# Patient Record
Sex: FEMALE | Race: WHITE | HISPANIC OR LATINO
[De-identification: names, ages, dates, MRNs, and addresses within clinical notes are randomized per-mention and may not be internally consistent; named-entity substitution may affect disease eponyms.]

---

## 2022-06-16 ENCOUNTER — NON-APPOINTMENT (OUTPATIENT)
Age: 27
End: 2022-06-16

## 2023-01-28 ENCOUNTER — NON-APPOINTMENT (OUTPATIENT)
Age: 28
End: 2023-01-28

## 2024-01-13 ENCOUNTER — NON-APPOINTMENT (OUTPATIENT)
Age: 29
End: 2024-01-13

## 2024-01-17 ENCOUNTER — APPOINTMENT (OUTPATIENT)
Age: 29
End: 2024-01-17
Payer: COMMERCIAL

## 2024-01-17 VITALS
BODY MASS INDEX: 27.28 KG/M2 | HEART RATE: 82 BPM | DIASTOLIC BLOOD PRESSURE: 84 MMHG | SYSTOLIC BLOOD PRESSURE: 141 MMHG | HEIGHT: 68 IN | WEIGHT: 180 LBS

## 2024-01-17 DIAGNOSIS — S39.012A STRAIN OF MUSCLE, FASCIA AND TENDON OF LOWER BACK, INITIAL ENCOUNTER: ICD-10-CM

## 2024-01-17 PROCEDURE — 99203 OFFICE O/P NEW LOW 30 MIN: CPT

## 2024-01-17 PROCEDURE — 72100 X-RAY EXAM L-S SPINE 2/3 VWS: CPT

## 2024-01-17 NOTE — PHYSICAL EXAM
Alessandra comes in for complete physical with pap and pelvic.  Has concerns regarding refill of her levothyroxine which she has been out of for about a week. Patient is getting medications from endocrinologist however they could not refill it as patient was last seen by them in 2015. They have advised regarding continuing follow-up with PCP.    Patient Active Problem List   Diagnosis   • JOINT PAIN-SHLDER -   right   • Right elbow sprain   • Thyroid disease   • GERD (gastroesophageal reflux disease)   • Unspecified hypothyroidism   • Goiter, unspecified   • Abnormal thyroid ultrasound       Past Surgical History:   Procedure Laterality Date   •  SECTION, CLASSIC      3   • ESOPHAGOGASTRODUODENOSCOPY TRANSORAL FLEX W/BX SINGLE OR MULT  2012    GERD, gastroduodenitis   • TUBAL LIGATION         Current Outpatient Prescriptions   Medication Sig Dispense Refill   • levothyroxine (SYNTHROID, LEVOTHROID) 137 MCG tablet Take 137 mcg by mouth daily. Needs lab before future refills 45 tablet 0     No current facility-administered medications for this visit.        ALLERGIES:  No Known Allergies    Social History     Social History   • Marital status: Single     Spouse name: N/A   • Number of children: 4   • Years of education: N/A     Occupational History   • [employer]     Social History Main Topics   • Smoking status: Passive Smoke Exposure - Never Smoker   • Smokeless tobacco: Never Used      Comment: sister smokes at home   • Alcohol use No   • Drug use: No   • Sexual activity: No     Other Topics Concern   • Not on file     Social History Narrative       Family History   Problem Relation Age of Onset   • Cancer Mother      kidney   • Cancer Brother      esophagus- smoker   • Thyroid Sister        REVIEW OF SYSTEMS  CONSTITUTIONAL:  Pt denies fever or chills, night sweats, appetite problems, fatigue, any other difficulties  EYES: Pt denies recent changes in vision  Pt does wear  glasses.  EARS: Pt. deniesany ear or hearing problems, ear pain, ringing in the ears, discharge  NOSE & THROAT: Pt denies any nose, sinus or throat problems, hay fever, chronc sinususitis, nose bleeds, throat irritation, hoarseness  LUNGS: Pt denies any lung problems, cough, shortness of breath, wheezing, asthma, COPD or emphysema  HEART: Pt denies Any cardiac problems, chest pain or tightness, heart murmurs, hypertension, palpitations, irregular heartbeat, rapid heartbeat, orthopnea, paroxysmal nocturnal dyspnea, claudication, leg or ankle swelling, coronary artery disease  GI: Pt denies heartburn or indigestion, nausea, vomiting, abdominal pain, diarrhea, constipation, blood in stool, black tarry stools  : Pt denies urinary tract problems, dysuria, urgency, blood in urine, nocturia, sexual concerns  MUSCULOSKELETAL: Pt denies  any musculoskeletal complaints, painful or swollen joints, back pain  NEUROLOGICAL: Pt denies any neurological problems, headaches, dizziness  ENDOCRINE: Pt has no history of diabetes, but has thyroid disease  HEMATOLOGY: Pt denies any hematologic problems, anemia, lymphadenopathy, bleeding problems, easy bruising  PSYCH: Pt denies  no symptoms of depression    HEALTH CARE MAINTENANCE:    Cholesterol - Will check today    Mammogram - she was counseled regarding importance of mammogram and risks of not having up to missing diagnoses of cancer.    Diet/Exercise - Discussed    Tobacco/Alcohol use - Discussed    Calcium intake - Discussed, appears to have adequate intake    Osteoporosis prevention - Discussed    Colon cancer screening - Discussed    Immunizations: Discussed    EXAM:  General - Healthy, alert, in no distress  Visit Vitals   • /72   • Pulse 68   • Temp 98 °F (36.7 °C) (Oral)   • Resp 14   • Ht 5' 2\" (1.575 m)   • Wt 72.8 kg   • LMP  (LMP Unknown)   • BMI 29.34 kg/m2      Ears - TM's and canals normal  PERRLA, EOMI, conjunctivae normal.   Mouth -No lesions   Neck - supple,  no lymphadenopathy and no thyromegaly  Lungs - clear to auscultation, easy respirations  Heart - S1 and S2, Regular rate and rhythm and No murmur, rub, gallop  Breasts/Axillae - Breast are symmetrical, no skin changes, No masses, no dimpling, no nipple discharge and No axillary or supraclavicular nodes bilaterally  Abdomen - Soft, flat, non-tender and No masses, organomegaly  Extremities - Normal, No cyanosis, clubbing and No edema  Peripheral pulses 2 plus and equal posterior tibial, dorsalis pedis and radial.   Deep tendon reflexes legs 2 plus and equal bilaterally.   Skin - Skin color, texture, turgor are normal. There are no bruises, rashes or lesions on areas seen. She has refused skin cancer screening.  Pelvic--External Genitalia -  Normal appearing labia, clitoris, perineum, no lesions and Carroll's, bulbourethral glands normal  Vagina -  Normal mucosa and No lesions  Cervix - Normal in appearance, no lesions or masses and pap smear obtained  Uterus - small, nontender, symmetrical  Adnexa - unremarkable, non-tender, no fullness noted and no masses noted  Rectal - normal tone, no masses and no hemorrhoids     (Z00.00) Annual physical exam  (primary encounter diagnosis)  Plan: CBC & AUTO DIFFERENTIAL, COMPREHENSIVE         METABOLIC PANEL, LIPID PANEL WITH REFLEX,         THINPREP PAP TEST WITH HPV REGARDLESS,       (Z12.31) Visit for screening mammogram  Plan: MAMMO SCREENING BILATERAL    (E03.9) Hypothyroidism, unspecified type  Comment: Patient counseled regarding importance of current for and regular monitoring of thyroid function  Plan: THYROID STIMULATING HORMONE    (Z11.59) Need for hepatitis C screening test  Comment: Patient will check with her insurance regarding coverage and call the clinic who would like to proceed with the test    (Z12.11) Screen for colon cancer  Comment: We have discussed risks of not performing the screening. Patient is interested in colonoscopy.  Plan: OPEN ACCESS  COLONOSCOPY    Patient was counseled regarding importance of at least being seen once a year.     [Antalgic] : not antalgic [de-identified] : Examination of the lumbar spine reveals no midline tenderness palpation, step-offs, or skin lesions. Decreased range of motion with respect to flexion, extension, lateral bending, and rotation. No tenderness to palpation of the sciatic notch. No tenderness palpation of the bilateral greater trochanters. No pain with passive internal/external rotation of the hips. No instability of bilateral lower extremities.  Negative COURTNEY. Negative straight leg raise bilaterally. No bowstring. Negative femoral stretch. 5 out of 5 iliopsoas, hip abductors, hips adductors, quadriceps, hamstrings, gastrocsoleus, tibialis anterior, extensor hallucis longus, peroneals. Grossly intact sensation to light touch bilateral lower extremities. 1+ patellar and Achilles reflexes. Downgoing Babinski. No clonus. Intact proprioception. Palpable pulses. No skin lesion and no edema on the right and left lower extremities. [de-identified] : AP lateral lumbar x-rays with preserved alignment without instability or aggressive lesions
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
## 2024-01-17 NOTE — DISCUSSION/SUMMARY
[de-identified] : We discussed further treatment options.  She will try course of physical therapy.  MRI if not improved or worsen.